# Patient Record
Sex: FEMALE | Race: WHITE | NOT HISPANIC OR LATINO | Employment: FULL TIME | URBAN - NONMETROPOLITAN AREA
[De-identification: names, ages, dates, MRNs, and addresses within clinical notes are randomized per-mention and may not be internally consistent; named-entity substitution may affect disease eponyms.]

---

## 2020-03-04 ENCOUNTER — TRANSCRIBE ORDERS (OUTPATIENT)
Dept: ADMINISTRATIVE | Facility: HOSPITAL | Age: 60
End: 2020-03-04

## 2020-03-04 DIAGNOSIS — G50.0 TRIGEMINAL NEURALGIA: Primary | ICD-10-CM

## 2020-03-11 ENCOUNTER — HOSPITAL ENCOUNTER (OUTPATIENT)
Dept: MRI IMAGING | Facility: HOSPITAL | Age: 60
Discharge: HOME | End: 2020-03-11

## 2020-03-11 DIAGNOSIS — G50.0 TRIGEMINAL NEURALGIA: ICD-10-CM

## 2020-03-11 LAB — CREAT BLDA-MCNC: 0.7 MG/DL (ref 0.6–1.3)

## 2020-03-11 PROCEDURE — 82565 ASSAY OF CREATININE: CPT

## 2024-10-21 ENCOUNTER — TRANSCRIBE ORDERS (OUTPATIENT)
Dept: ADMINISTRATIVE | Facility: HOSPITAL | Age: 64
End: 2024-10-21
Payer: COMMERCIAL

## 2024-10-21 DIAGNOSIS — R06.09 DYSPNEA ON EXERTION: ICD-10-CM

## 2024-10-21 DIAGNOSIS — R07.89 CHEST PRESSURE: Primary | ICD-10-CM

## 2024-10-25 ENCOUNTER — OFFICE VISIT (OUTPATIENT)
Dept: FAMILY MEDICINE CLINIC | Facility: CLINIC | Age: 64
End: 2024-10-25
Payer: COMMERCIAL

## 2024-10-25 VITALS
HEIGHT: 68 IN | BODY MASS INDEX: 36.07 KG/M2 | DIASTOLIC BLOOD PRESSURE: 88 MMHG | HEART RATE: 69 BPM | OXYGEN SATURATION: 98 % | RESPIRATION RATE: 18 BRPM | SYSTOLIC BLOOD PRESSURE: 140 MMHG | TEMPERATURE: 98 F | WEIGHT: 238 LBS

## 2024-10-25 DIAGNOSIS — M25.50 ARTHRALGIA, UNSPECIFIED JOINT: ICD-10-CM

## 2024-10-25 DIAGNOSIS — M25.40 JOINT SWELLING: ICD-10-CM

## 2024-10-25 DIAGNOSIS — I10 ESSENTIAL HYPERTENSION: Chronic | ICD-10-CM

## 2024-10-25 DIAGNOSIS — Z12.31 ENCOUNTER FOR SCREENING MAMMOGRAM FOR MALIGNANT NEOPLASM OF BREAST: ICD-10-CM

## 2024-10-25 DIAGNOSIS — G89.29 CHRONIC RIGHT SHOULDER PAIN: ICD-10-CM

## 2024-10-25 DIAGNOSIS — I48.0 PAROXYSMAL ATRIAL FIBRILLATION: Chronic | ICD-10-CM

## 2024-10-25 DIAGNOSIS — M79.641 RIGHT HAND PAIN: ICD-10-CM

## 2024-10-25 DIAGNOSIS — M25.531 RIGHT WRIST PAIN: ICD-10-CM

## 2024-10-25 DIAGNOSIS — Z76.89 ENCOUNTER TO ESTABLISH CARE: Primary | ICD-10-CM

## 2024-10-25 DIAGNOSIS — M25.511 CHRONIC RIGHT SHOULDER PAIN: ICD-10-CM

## 2024-10-25 DIAGNOSIS — F41.9 ANXIETY: ICD-10-CM

## 2024-10-25 PROBLEM — E78.2 MIXED HYPERLIPIDEMIA: Status: ACTIVE | Noted: 2024-10-25

## 2024-10-25 PROCEDURE — 99204 OFFICE O/P NEW MOD 45 MIN: CPT | Performed by: NURSE PRACTITIONER

## 2024-10-25 RX ORDER — INCLISIRAN 284 MG/1.5ML
INJECTION, SOLUTION SUBCUTANEOUS
COMMUNITY

## 2024-10-25 RX ORDER — DULOXETIN HYDROCHLORIDE 30 MG/1
30 CAPSULE, DELAYED RELEASE ORAL DAILY
Qty: 30 CAPSULE | Refills: 1 | Status: SHIPPED | OUTPATIENT
Start: 2024-10-25

## 2024-10-25 RX ORDER — FLUTICASONE PROPIONATE 50 UG/1
1 SPRAY, METERED NASAL 2 TIMES DAILY
COMMUNITY

## 2024-10-25 RX ORDER — LISINOPRIL 40 MG/1
40 TABLET ORAL DAILY
COMMUNITY

## 2024-10-25 RX ORDER — SENNOSIDES 8.6 MG
650 CAPSULE ORAL 2 TIMES DAILY
COMMUNITY

## 2024-10-25 RX ORDER — INDAPAMIDE 2.5 MG/1
1 TABLET ORAL DAILY
COMMUNITY

## 2024-10-25 RX ORDER — TIMOLOL MALEATE 5 MG/ML
1 SOLUTION/ DROPS OPHTHALMIC EVERY MORNING
COMMUNITY
Start: 2024-09-18

## 2024-10-25 RX ORDER — FLECAINIDE ACETATE 50 MG/1
50 TABLET ORAL 2 TIMES DAILY
COMMUNITY
Start: 2024-09-04

## 2024-10-25 RX ORDER — CITALOPRAM HYDROBROMIDE 10 MG/1
10 TABLET ORAL DAILY
Qty: 30 TABLET | Refills: 1 | Status: CANCELLED | OUTPATIENT
Start: 2024-10-25

## 2024-10-25 RX ORDER — LATANOPROST 0.005 %
1 DROPS OPHTHALMIC (EYE) NIGHTLY
COMMUNITY

## 2024-10-25 RX ORDER — DILTIAZEM HYDROCHLORIDE 120 MG/1
120 CAPSULE, EXTENDED RELEASE ORAL DAILY
COMMUNITY
Start: 2024-09-04

## 2024-10-25 RX ORDER — APIXABAN 5 MG/1
1 TABLET, FILM COATED ORAL 2 TIMES DAILY
COMMUNITY

## 2024-10-25 NOTE — PROGRESS NOTES
CC:   Chief Complaint   Patient presents with    [facility]    Pain     Shoulder and hand pain        History:  Violet Watts is a 63 y.o. female who presents today for evaluation of the above problems.      HPI    Patient presents to [facility]. I am familiar with patient.   Goes to cardiology routinely. States she was recently put on losartan and caused SE of memory problems and other odd symptoms- stopped that and doing better on lisinopril. She is still having intermittent chest pain so scheduled for additional work up- has Lexiscan coming up.   Reports she is feeling more anxious lately and irritable. Used to take Celexa and worked well for her in the past- interested in getting on medication again.     Riht shoulder pain- fell on right shoulder years ago- bothering her more lately. Wants xray to evaluate further. Unable to take NSAIDs due to medications and heart- Uses Tylenol and Voltaren with very little relief in symptoms.   Right hand- middle finger joints are swollen and tender. Another provider treated her for gout with no change in symptoms. Believe arthritic based on how her hands look and stiffness. Will order xray to evaluate further.   Due for mammogram- will get that ordered.     Allergies   Allergen Reactions    Losartan Mental Status Change    Praluent [Alirocumab] Rash    Repatha [Evolocumab] Rash     Past Medical History:   Diagnosis Date    Arthritis     Atrial fibrillation     Glaucoma (increased eye pressure) Left eye     Hyperlipidemia     Hypertension     SVT (supraventricular tachycardia)      Past Surgical History:   Procedure Laterality Date    BELPHAROPTOSIS REPAIR Bilateral     CATARACT EXTRACTION Bilateral     CHOLECYSTECTOMY      COLONOSCOPY  11/17/2023    Dr Pham.  On 10 year plan    ENDOLYMPHATIC SHUNT DECOMPRESSION      HYSTERECTOMY      LAMINECTOMY       Family History   Problem Relation Age of Onset    Breast cancer Mother      Cancer Mother     Cancer Sister     Breast cancer Sister     Breast cancer Sister     Cancer Sister       reports that she has never smoked. She has never been exposed to tobacco smoke. She has never used smokeless tobacco. She reports that she does not drink alcohol and does not use drugs.      Current Outpatient Medications:     acetaminophen (TYLENOL) 650 MG 8 hr tablet, Take 1 tablet by mouth 2 (Two) Times a Day., Disp: , Rfl:     Cartia  MG 24 hr capsule, Take 1 capsule by mouth Daily., Disp: , Rfl:     Cholecalciferol 1.25 MG (11078 UT) tablet, Take 1 tablet by mouth 1 (One) Time Per Week., Disp: 4 tablet, Rfl: 5    Eliquis 5 MG tablet tablet, Take 1 tablet by mouth 2 (Two) Times a Day., Disp: , Rfl:     flecainide (TAMBOCOR) 50 MG tablet, Take 1 tablet by mouth 2 (Two) Times a Day., Disp: , Rfl:     fluticasone (Flonase Allergy Relief) 50 MCG/ACT nasal spray, Administer 1 spray into the nostril(s) as directed by provider 2 (Two) Times a Day., Disp: , Rfl:     Inclisiran Sodium (Leqvio) 284 MG/1.5ML solution prefilled syringe, Inject 1.5 mL every 3 months by subcutaneous route., Disp: , Rfl:     indapamide (LOZOL) 2.5 MG tablet, Take 1 tablet by mouth Daily., Disp: , Rfl:     lisinopril (PRINIVIL,ZESTRIL) 40 MG tablet, Take 1 tablet by mouth Daily., Disp: , Rfl:     multivitamin with minerals (WOMENS MULTIVITAMIN PO), Take 1 tablet by mouth Daily., Disp: , Rfl:     Red Yeast Rice Extract 600 MG tablet, Take 1 tablet by mouth Daily., Disp: , Rfl:     timolol (TIMOPTIC) 0.5 % ophthalmic solution, Administer 1 drop to both eyes Every Morning., Disp: , Rfl:     Xalatan 0.005 % ophthalmic solution, Administer 1 drop to both eyes Every Night., Disp: , Rfl:     DULoxetine (CYMBALTA) 30 MG capsule, Take 1 capsule by mouth Daily., Disp: 30 capsule, Rfl: 1    OBJECTIVE:  /88 (BP Location: Left arm, Patient Position: Sitting, Cuff Size: Large Adult)   Pulse 69   Temp 98 °F (36.7 °C) (Temporal)   Resp 18    "Ht 172.7 cm (68\")   Wt 108 kg (238 lb)   SpO2 98%   BMI 36.19 kg/m²    Estimated body mass index is 36.19 kg/m² as calculated from the following:    Height as of this encounter: 172.7 cm (68\").    Weight as of this encounter: 108 kg (238 lb).                Physical Exam  Vitals reviewed.   Constitutional:       General: She is not in acute distress.     Appearance: Normal appearance.   HENT:      Head: Normocephalic and atraumatic.   Cardiovascular:      Rate and Rhythm: Normal rate and regular rhythm.      Heart sounds: Normal heart sounds.   Pulmonary:      Effort: No respiratory distress.      Breath sounds: Normal breath sounds. No wheezing.   Musculoskeletal:      Right shoulder: No tenderness. Normal range of motion.      Left shoulder: No tenderness. Normal range of motion.      Right hand: Bony tenderness (finger joints) present. Decreased range of motion (to fingers).      Left hand: Bony tenderness present. Decreased range of motion.      Cervical back: Normal range of motion.      Comments: DIP joints on right fingers inflamed (worse to middle finger)   Skin:     General: Skin is warm and dry.   Neurological:      Mental Status: She is alert and oriented to person, place, and time.   Psychiatric:         Mood and Affect: Mood normal.         Behavior: Behavior normal.              Assessment/Plan    Diagnoses and all orders for this visit:    1. Encounter to establish care (Primary)    2. Chronic right shoulder pain  -     XR Shoulder 2+ View Right; Future    3. Right hand pain  -     XR Hand 3+ View Right; Future    4. Joint swelling  -     XR Hand 3+ View Right; Future    5. Right wrist pain  -     XR Wrist 3+ View Right; Future    6. Anxiety  -     DULoxetine (CYMBALTA) 30 MG capsule; Take 1 capsule by mouth Daily.  Dispense: 30 capsule; Refill: 1    7. Arthralgia, unspecified joint  -     DULoxetine (CYMBALTA) 30 MG capsule; Take 1 capsule by mouth Daily.  Dispense: 30 capsule; Refill: 1    8. " Encounter for screening mammogram for malignant neoplasm of breast  -     Mammo Screening Digital Tomosynthesis Bilateral With CAD; Future    9. Essential hypertension    10. Paroxysmal atrial fibrillation    Other orders  -     Cholecalciferol 1.25 MG (52279 UT) tablet; Take 1 tablet by mouth 1 (One) Time Per Week.  Dispense: 4 tablet; Refill: 5    Xray to evaluate further. Likely arthritis related.   Will try Cymbalta to see if that will help with both joint inflammation and anxiety. Medication SE discussed. If any concerns- let us know.   F/u 1 month            An After Visit Summary was printed and given to the patient at discharge.  Return in about 4 weeks (around 11/22/2024) for Recheck anxiety and joint pain.

## 2024-11-20 ENCOUNTER — OFFICE VISIT (OUTPATIENT)
Dept: FAMILY MEDICINE CLINIC | Facility: CLINIC | Age: 64
End: 2024-11-20
Payer: COMMERCIAL

## 2024-11-20 VITALS
SYSTOLIC BLOOD PRESSURE: 144 MMHG | WEIGHT: 235.4 LBS | OXYGEN SATURATION: 98 % | BODY MASS INDEX: 35.68 KG/M2 | DIASTOLIC BLOOD PRESSURE: 87 MMHG | TEMPERATURE: 98.2 F | HEART RATE: 71 BPM | HEIGHT: 68 IN

## 2024-11-20 DIAGNOSIS — R05.1 ACUTE COUGH: ICD-10-CM

## 2024-11-20 DIAGNOSIS — J06.9 UPPER RESPIRATORY TRACT INFECTION, UNSPECIFIED TYPE: Primary | ICD-10-CM

## 2024-11-20 LAB
EXPIRATION DATE: NORMAL
FLUAV AG UPPER RESP QL IA.RAPID: NOT DETECTED
FLUBV AG UPPER RESP QL IA.RAPID: NOT DETECTED
INTERNAL CONTROL: NORMAL
Lab: NORMAL
SARS-COV-2 AG UPPER RESP QL IA.RAPID: NOT DETECTED

## 2024-11-20 RX ORDER — TRIAMCINOLONE ACETONIDE 40 MG/ML
40 INJECTION, SUSPENSION INTRA-ARTICULAR; INTRAMUSCULAR ONCE
Status: COMPLETED | OUTPATIENT
Start: 2024-11-20 | End: 2024-11-20

## 2024-11-20 RX ORDER — AZITHROMYCIN 250 MG/1
TABLET, FILM COATED ORAL
Qty: 6 TABLET | Refills: 0 | Status: SHIPPED | OUTPATIENT
Start: 2024-11-20

## 2024-11-20 RX ADMIN — TRIAMCINOLONE ACETONIDE 40 MG: 40 INJECTION, SUSPENSION INTRA-ARTICULAR; INTRAMUSCULAR at 10:40

## 2024-11-20 NOTE — PROGRESS NOTES
CC:   Chief Complaint   Patient presents with    Cough     Symptoms started on Friday.  Exposed to flu at work    Nasal Congestion    Headache    Dizziness    Chills    Sore Throat    Generalized Body Aches        History:  Violet Watts is a 64 y.o. female who presents today for evaluation of the above problems.      HPI    Patient presents for illness.  Symptoms started on Friday with head and chest congestion.  States it has progressively gotten a little worse.  Taking over-the-counter Zyrtec-D with some relief of symptoms.  Denies any fever.  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  Patient denies any GI symptoms.      Did not tolerate Cymbalta- states she only took 2 pills- it caused her to be confused and unable to sleep. Overall just didn't feel right on the medication.     Allergies   Allergen Reactions    Losartan Mental Status Change    Praluent [Alirocumab] Rash    Repatha [Evolocumab] Rash     Past Medical History:   Diagnosis Date    Arthritis     Atrial fibrillation     Glaucoma (increased eye pressure) Left eye     Hyperlipidemia     Hypertension     SVT (supraventricular tachycardia)      Past Surgical History:   Procedure Laterality Date    BELPHAROPTOSIS REPAIR Bilateral     CATARACT EXTRACTION Bilateral     CHOLECYSTECTOMY      COLONOSCOPY  11/17/2023    Dr Pham.  On 10 year plan    ENDOLYMPHATIC SHUNT DECOMPRESSION      HYSTERECTOMY      LAMINECTOMY       Family History   Problem Relation Age of Onset    Breast cancer Mother     Cancer Mother     Cancer Sister     Breast cancer Sister     Breast cancer Sister     Cancer Sister       reports that she has never smoked. She has never been exposed to tobacco smoke. She has never used smokeless tobacco. She reports that she does not drink alcohol and does not use drugs.      Current Outpatient Medications:     acetaminophen (TYLENOL) 650 MG 8 hr tablet, Take 1 tablet by mouth 2 (Two) Times a Day., Disp: , Rfl:     Cartia  " MG 24 hr capsule, Take 1 capsule by mouth Daily., Disp: , Rfl:     Cetirizine-Pseudoephedrine (ZYRTEC-D ALLERGY & CONGESTION PO), Take  by mouth., Disp: , Rfl:     Cholecalciferol 1.25 MG (77218 UT) tablet, Take 1 tablet by mouth 1 (One) Time Per Week., Disp: 4 tablet, Rfl: 5    Eliquis 5 MG tablet tablet, Take 1 tablet by mouth 2 (Two) Times a Day., Disp: , Rfl:     flecainide (TAMBOCOR) 50 MG tablet, Take 1 tablet by mouth 2 (Two) Times a Day., Disp: , Rfl:     fluticasone (Flonase Allergy Relief) 50 MCG/ACT nasal spray, Administer 1 spray into the nostril(s) as directed by provider 2 (Two) Times a Day., Disp: , Rfl:     Inclisiran Sodium (Leqvio) 284 MG/1.5ML solution prefilled syringe, Inject 1.5 mL every 3 months by subcutaneous route., Disp: , Rfl:     indapamide (LOZOL) 2.5 MG tablet, Take 1 tablet by mouth Daily., Disp: , Rfl:     lisinopril (PRINIVIL,ZESTRIL) 40 MG tablet, Take 1 tablet by mouth Daily., Disp: , Rfl:     multivitamin with minerals (WOMENS MULTIVITAMIN PO), Take 1 tablet by mouth Daily., Disp: , Rfl:     Red Yeast Rice Extract 600 MG tablet, Take 1 tablet by mouth Daily., Disp: , Rfl:     timolol (TIMOPTIC) 0.5 % ophthalmic solution, Administer 1 drop to both eyes Every Morning., Disp: , Rfl:     Xalatan 0.005 % ophthalmic solution, Administer 1 drop to both eyes Every Night., Disp: , Rfl:     azithromycin (Zithromax Z-Darian) 250 MG tablet, Take 2 tablets by mouth on day 1, then 1 tablet daily on days 2-5, Disp: 6 tablet, Rfl: 0  No current facility-administered medications for this visit.    OBJECTIVE:  /87 (BP Location: Right arm, Patient Position: Sitting, Cuff Size: Large Adult)   Pulse 71   Temp 98.2 °F (36.8 °C) (Temporal)   Ht 172.7 cm (68\")   Wt 107 kg (235 lb 6.4 oz)   SpO2 98%   BMI 35.79 kg/m²    Estimated body mass index is 35.79 kg/m² as calculated from the following:    Height as of this encounter: 172.7 cm (68\").    Weight as of this encounter: 107 kg (235 lb " 6.4 oz).                Physical Exam  Vitals reviewed.   Constitutional:       General: She is not in acute distress.     Appearance: Normal appearance.   HENT:      Head: Normocephalic and atraumatic.      Right Ear: Tympanic membrane and external ear normal.      Left Ear: Tympanic membrane and external ear normal.      Nose: No congestion.      Right Sinus: Maxillary sinus tenderness and frontal sinus tenderness present.      Left Sinus: Maxillary sinus tenderness and frontal sinus tenderness present.      Mouth/Throat:      Pharynx: Oropharynx is clear.   Cardiovascular:      Rate and Rhythm: Normal rate and regular rhythm.      Heart sounds: Normal heart sounds.   Pulmonary:      Effort: No respiratory distress.      Breath sounds: Normal breath sounds. No wheezing.   Musculoskeletal:         General: Normal range of motion.   Lymphadenopathy:      Cervical: No cervical adenopathy.   Skin:     General: Skin is warm and dry.   Neurological:      Mental Status: She is alert and oriented to person, place, and time.   Psychiatric:         Mood and Affect: Mood normal.         Behavior: Behavior normal.              Assessment/Plan    Diagnoses and all orders for this visit:    1. Upper respiratory tract infection, unspecified type (Primary)  -     azithromycin (Zithromax Z-Darian) 250 MG tablet; Take 2 tablets by mouth on day 1, then 1 tablet daily on days 2-5  Dispense: 6 tablet; Refill: 0  -     triamcinolone acetonide (KENALOG-40) injection 40 mg    2. Acute cough  -     POCT SARS-CoV-2 Antigen ELEAZAR + Flu    Medication SE discussed. If any concerns- patient to let us know. Discussed OTC medications to help with symptoms.   If worse or no better in 3-5 days- patient to let us know            An After Visit Summary was printed and given to the patient at discharge.  Return if symptoms worsen or fail to improve.

## 2024-11-25 ENCOUNTER — HOSPITAL ENCOUNTER (OUTPATIENT)
Dept: CARDIOLOGY | Facility: HOSPITAL | Age: 64
Discharge: HOME | End: 2024-11-25
Payer: COMMERCIAL

## 2024-11-25 VITALS
BODY MASS INDEX: 33.77 KG/M2 | WEIGHT: 235.89 LBS | HEART RATE: 75 BPM | SYSTOLIC BLOOD PRESSURE: 144 MMHG | HEIGHT: 70 IN | DIASTOLIC BLOOD PRESSURE: 108 MMHG

## 2024-11-25 DIAGNOSIS — R07.89 CHEST PRESSURE: ICD-10-CM

## 2024-11-25 DIAGNOSIS — R06.09 DYSPNEA ON EXERTION: ICD-10-CM

## 2024-11-25 LAB
BH CV NUCLEAR PRIOR STUDY: 3
BH CV REST NUCLEAR ISOTOPE DOSE: 11.2 MCI
BH CV STRESS BP STAGE 1: NORMAL
BH CV STRESS COMMENTS STAGE 1: NORMAL
BH CV STRESS DOSE REGADENOSON STAGE 1: 0.4
BH CV STRESS DURATION MIN STAGE 1: 0
BH CV STRESS DURATION SEC STAGE 1: 10
BH CV STRESS HR STAGE 1: 94
BH CV STRESS NUCLEAR ISOTOPE DOSE: 34.9 MCI
BH CV STRESS PROTOCOL 1: NORMAL
BH CV STRESS RECOVERY BP: NORMAL MMHG
BH CV STRESS RECOVERY HR: 77 BPM
BH CV STRESS STAGE 1: 1
LV EF NUC BP: 65 %
MAXIMAL PREDICTED HEART RATE: 155 BPM
PERCENT MAX PREDICTED HR: 60.65 %
STRESS BASELINE BP: NORMAL MMHG
STRESS BASELINE HR: 70 BPM
STRESS PERCENT HR: 71 %
STRESS POST EXERCISE DUR MIN: 0 MIN
STRESS POST EXERCISE DUR SEC: 10 SEC
STRESS POST PEAK BP: NORMAL MMHG
STRESS POST PEAK HR: 94 BPM
STRESS TARGET HR: 132 BPM

## 2024-11-25 PROCEDURE — 93018 CV STRESS TEST I&R ONLY: CPT | Performed by: INTERNAL MEDICINE

## 2024-11-25 PROCEDURE — 25010000002 REGADENOSON 0.4 MG/5ML SOLUTION: Performed by: INTERNAL MEDICINE

## 2024-11-25 PROCEDURE — 78452 HT MUSCLE IMAGE SPECT MULT: CPT

## 2024-11-25 PROCEDURE — 34310000005 TECHNETIUM TETROFOSMIN KIT: Performed by: NURSE PRACTITIONER

## 2024-11-25 PROCEDURE — 78452 HT MUSCLE IMAGE SPECT MULT: CPT | Performed by: INTERNAL MEDICINE

## 2024-11-25 PROCEDURE — A9502 TC99M TETROFOSMIN: HCPCS | Performed by: NURSE PRACTITIONER

## 2024-11-25 PROCEDURE — 93017 CV STRESS TEST TRACING ONLY: CPT

## 2024-11-25 RX ORDER — REGADENOSON 0.08 MG/ML
0.4 INJECTION, SOLUTION INTRAVENOUS ONCE
Status: COMPLETED | OUTPATIENT
Start: 2024-11-25 | End: 2024-11-25

## 2024-11-25 RX ADMIN — TETROFOSMIN 1 DOSE: 1.38 INJECTION, POWDER, LYOPHILIZED, FOR SOLUTION INTRAVENOUS at 10:53

## 2024-11-25 RX ADMIN — REGADENOSON 0.4 MG: 0.08 INJECTION, SOLUTION INTRAVENOUS at 11:44

## 2024-11-25 RX ADMIN — TETROFOSMIN 1 DOSE: 1.38 INJECTION, POWDER, LYOPHILIZED, FOR SOLUTION INTRAVENOUS at 11:50

## 2025-01-15 ENCOUNTER — OFFICE VISIT (OUTPATIENT)
Dept: FAMILY MEDICINE CLINIC | Facility: CLINIC | Age: 65
End: 2025-01-15
Payer: COMMERCIAL

## 2025-01-15 VITALS
HEART RATE: 76 BPM | BODY MASS INDEX: 33.36 KG/M2 | WEIGHT: 233 LBS | OXYGEN SATURATION: 98 % | HEIGHT: 70 IN | SYSTOLIC BLOOD PRESSURE: 159 MMHG | RESPIRATION RATE: 18 BRPM | DIASTOLIC BLOOD PRESSURE: 95 MMHG | TEMPERATURE: 98.1 F

## 2025-01-15 DIAGNOSIS — Z11.59 ENCOUNTER FOR HEPATITIS C SCREENING TEST FOR LOW RISK PATIENT: ICD-10-CM

## 2025-01-15 DIAGNOSIS — E78.5 HYPERLIPIDEMIA, UNSPECIFIED HYPERLIPIDEMIA TYPE: ICD-10-CM

## 2025-01-15 DIAGNOSIS — R10.2 PELVIC PRESSURE IN FEMALE: ICD-10-CM

## 2025-01-15 DIAGNOSIS — R42 DIZZINESS: ICD-10-CM

## 2025-01-15 DIAGNOSIS — R55 SYNCOPE, UNSPECIFIED SYNCOPE TYPE: Primary | ICD-10-CM

## 2025-01-15 LAB
BILIRUB BLD-MCNC: NEGATIVE MG/DL
CLARITY, POC: CLEAR
COLOR UR: YELLOW
GLUCOSE UR STRIP-MCNC: NEGATIVE MG/DL
KETONES UR QL: NEGATIVE
LEUKOCYTE EST, POC: ABNORMAL
NITRITE UR-MCNC: NEGATIVE MG/ML
PH UR: 5.5 [PH] (ref 5–8)
PROT UR STRIP-MCNC: NEGATIVE MG/DL
RBC # UR STRIP: NEGATIVE /UL
SP GR UR: 1.01 (ref 1–1.03)
UROBILINOGEN UR QL: ABNORMAL

## 2025-01-15 PROCEDURE — 81003 URINALYSIS AUTO W/O SCOPE: CPT | Performed by: NURSE PRACTITIONER

## 2025-01-15 PROCEDURE — 99214 OFFICE O/P EST MOD 30 MIN: CPT | Performed by: NURSE PRACTITIONER

## 2025-01-15 NOTE — PROGRESS NOTES
CC:   Chief Complaint   Patient presents with    Syncope     Last Friday after having severe back pain        History:  Violet Watts is a 64 y.o. female who presents today for evaluation of the above problems.      HPI    Patient reports she had a syncopal episode on Friday afternoon. She had taken a nap before work and when she woke up and was sitting on side of bed for a minute- felt severe low back pain, lightheaded and felt like she was going to get sick. She passed out - falling back on bed.  said she was passed out for about 10 minutes, urinated on herself. Was dizzy, disoriented and vomited twice once she woke up. States she could hear her  talking to her but was blacked out. Patient states she felt a little better after she vomited. She has weak and felt off several hours following that. She did not go to ER. Denies biting her tongue- states there was no jerking- she does not feel like she had a seizure. No history of seizures. Denies any chest pain. Reports on Saturday she felt better and has not had any symptoms of anything. Has appt with cardiology next week.   Patient mentioned she hasn't been eating much lately- decrease in appetite- only eating a bowl of cereal and some fruit most days.     Allergies   Allergen Reactions    Losartan Mental Status Change    Adalat [Nifedipine] Swelling    Phenazopyridine Itching    Statins Myalgia    Praluent [Alirocumab] Rash    Repatha [Evolocumab] Rash    Sulfa Antibiotics Rash     Past Medical History:   Diagnosis Date    Arthritis     Atrial fibrillation     Glaucoma (increased eye pressure) Left eye     Hyperlipidemia     Hypertension     SVT (supraventricular tachycardia)      Past Surgical History:   Procedure Laterality Date    BELPHAROPTOSIS REPAIR Bilateral     CATARACT EXTRACTION Bilateral     CHOLECYSTECTOMY      COLONOSCOPY  11/17/2023    Dr Pham.  On 10 year plan    ENDOLYMPHATIC SHUNT DECOMPRESSION      HYSTERECTOMY      LAMINECTOMY        Family History   Problem Relation Age of Onset    Breast cancer Mother     Cancer Mother     Cancer Sister     Breast cancer Sister     Breast cancer Sister     Cancer Sister       reports that she has never smoked. She has never been exposed to tobacco smoke. She has never used smokeless tobacco. She reports that she does not drink alcohol and does not use drugs.      Current Outpatient Medications:     acetaminophen (TYLENOL) 650 MG 8 hr tablet, Take 1 tablet by mouth 2 (Two) Times a Day., Disp: , Rfl:     Cartia  MG 24 hr capsule, Take 1 capsule by mouth Daily., Disp: , Rfl:     Cetirizine-Pseudoephedrine (ZYRTEC-D ALLERGY & CONGESTION PO), Take  by mouth., Disp: , Rfl:     Cholecalciferol 1.25 MG (61619 UT) tablet, Take 1 tablet by mouth 1 (One) Time Per Week., Disp: 4 tablet, Rfl: 5    Eliquis 5 MG tablet tablet, Take 1 tablet by mouth 2 (Two) Times a Day., Disp: , Rfl:     flecainide (TAMBOCOR) 50 MG tablet, Take 1 tablet by mouth 2 (Two) Times a Day., Disp: , Rfl:     fluticasone (Flonase Allergy Relief) 50 MCG/ACT nasal spray, Administer 1 spray into the nostril(s) as directed by provider 2 (Two) Times a Day., Disp: , Rfl:     Inclisiran Sodium (Leqvio) 284 MG/1.5ML solution prefilled syringe, Every 6 (Six) Months., Disp: , Rfl:     indapamide (LOZOL) 2.5 MG tablet, Take 1 tablet by mouth Daily., Disp: , Rfl:     lisinopril (PRINIVIL,ZESTRIL) 40 MG tablet, Take 1 tablet by mouth Daily., Disp: , Rfl:     multivitamin with minerals (WOMENS MULTIVITAMIN PO), Take 1 tablet by mouth Daily., Disp: , Rfl:     Red Yeast Rice Extract 600 MG tablet, Take 1 tablet by mouth Daily., Disp: , Rfl:     timolol (TIMOPTIC) 0.5 % ophthalmic solution, Administer 1 drop to both eyes Every Morning., Disp: , Rfl:     Xalatan 0.005 % ophthalmic solution, Administer 1 drop to both eyes Every Night., Disp: , Rfl:     OBJECTIVE:  /95 (BP Location: Right arm, Patient Position: Sitting, Cuff Size: Large Adult)   Pulse  "76   Temp 98.1 °F (36.7 °C) (Temporal)   Resp 18   Ht 177.8 cm (70\")   Wt 106 kg (233 lb)   SpO2 98%   BMI 33.43 kg/m²    Estimated body mass index is 33.43 kg/m² as calculated from the following:    Height as of this encounter: 177.8 cm (70\").    Weight as of this encounter: 106 kg (233 lb).                Physical Exam  Vitals reviewed.   Constitutional:       General: She is not in acute distress.     Appearance: Normal appearance.   HENT:      Head: Normocephalic and atraumatic.      Right Ear: Tympanic membrane and external ear normal.      Left Ear: Tympanic membrane and external ear normal.   Neck:      Vascular: No carotid bruit.   Cardiovascular:      Rate and Rhythm: Normal rate and regular rhythm.      Heart sounds: Normal heart sounds.   Pulmonary:      Effort: No respiratory distress.      Breath sounds: Normal breath sounds. No wheezing.   Abdominal:      General: Bowel sounds are normal.      Palpations: Abdomen is soft.      Tenderness: There is no abdominal tenderness.   Musculoskeletal:         General: Normal range of motion.      Cervical back: Normal range of motion.   Lymphadenopathy:      Cervical: No cervical adenopathy.   Skin:     General: Skin is warm and dry.   Neurological:      Mental Status: She is alert and oriented to person, place, and time.      Gait: Gait normal.   Psychiatric:         Mood and Affect: Mood normal.         Behavior: Behavior normal.              Assessment/Plan    Diagnoses and all orders for this visit:    1. Syncope, unspecified syncope type (Primary)  -     CBC & Differential  -     Comprehensive Metabolic Panel  -     Vitamin B12  -     TSH    Discussed differentials- believe it was a vasovagal response due to pain since she had severe back pain prior to passing out. Likely was back spasm since back pain resolved. Denies any stroke like symptoms prior to event. Patient does have paroxysmal afib- states her HR on her watch was in the upper 50's when she " checked it upon waking back up. Now- HR is regular rhythm on exam- has f/u with cardiology next week. Will do blood work to evaluate further. Patient mentioned some pelvic pressure so wanted checked for UTI- UA was good. Highly recommended her to eat more frequent meals with protein- not getting enough nutrition with current diet. With her incontinence and symptoms following the episode- will do MRI of head to evaluate further as well. Patient states she may need medication to calm her nerves due to anxiety having that done.     2. Dizziness  -     CBC & Differential  -     Comprehensive Metabolic Panel  -     Vitamin B12  -     TSH    3. Encounter for hepatitis C screening test for low risk patient  -     Hepatitis C Antibody    4. Hyperlipidemia, unspecified hyperlipidemia type  -     Lipid Panel    5. Pelvic pressure in female  -     POC Urinalysis Dipstick, Multipro                An After Visit Summary was printed and given to the patient at discharge.  Return in about 2 weeks (around 1/29/2025) for Recheck.

## 2025-01-16 LAB
ALBUMIN SERPL-MCNC: 4.6 G/DL (ref 3.5–5.2)
ALBUMIN/GLOB SERPL: 1.7 G/DL
ALP SERPL-CCNC: 71 U/L (ref 39–117)
ALT SERPL-CCNC: 26 U/L (ref 1–33)
AST SERPL-CCNC: 23 U/L (ref 1–32)
BASOPHILS # BLD AUTO: 0.06 10*3/MM3 (ref 0–0.2)
BASOPHILS NFR BLD AUTO: 0.5 % (ref 0–1.5)
BILIRUB SERPL-MCNC: 1 MG/DL (ref 0–1.2)
BUN SERPL-MCNC: 12 MG/DL (ref 8–23)
BUN/CREAT SERPL: 15.4 (ref 7–25)
CALCIUM SERPL-MCNC: 10.2 MG/DL (ref 8.6–10.5)
CHLORIDE SERPL-SCNC: 98 MMOL/L (ref 98–107)
CHOLEST SERPL-MCNC: 166 MG/DL (ref 0–200)
CO2 SERPL-SCNC: 26.5 MMOL/L (ref 22–29)
CREAT SERPL-MCNC: 0.78 MG/DL (ref 0.57–1)
EGFRCR SERPLBLD CKD-EPI 2021: 84.4 ML/MIN/1.73
EOSINOPHIL # BLD AUTO: 0.18 10*3/MM3 (ref 0–0.4)
EOSINOPHIL NFR BLD AUTO: 1.6 % (ref 0.3–6.2)
ERYTHROCYTE [DISTWIDTH] IN BLOOD BY AUTOMATED COUNT: 12.5 % (ref 12.3–15.4)
GLOBULIN SER CALC-MCNC: 2.7 GM/DL
GLUCOSE SERPL-MCNC: 96 MG/DL (ref 65–99)
HCT VFR BLD AUTO: 44.9 % (ref 34–46.6)
HCV IGG SERPL QL IA: NON REACTIVE
HDLC SERPL-MCNC: 58 MG/DL (ref 40–60)
HGB BLD-MCNC: 15.3 G/DL (ref 12–15.9)
IMM GRANULOCYTES # BLD AUTO: 0.05 10*3/MM3 (ref 0–0.05)
IMM GRANULOCYTES NFR BLD AUTO: 0.4 % (ref 0–0.5)
LDLC SERPL CALC-MCNC: 59 MG/DL (ref 0–100)
LYMPHOCYTES # BLD AUTO: 3.97 10*3/MM3 (ref 0.7–3.1)
LYMPHOCYTES NFR BLD AUTO: 34.6 % (ref 19.6–45.3)
MCH RBC QN AUTO: 30.8 PG (ref 26.6–33)
MCHC RBC AUTO-ENTMCNC: 34.1 G/DL (ref 31.5–35.7)
MCV RBC AUTO: 90.3 FL (ref 79–97)
MONOCYTES # BLD AUTO: 0.67 10*3/MM3 (ref 0.1–0.9)
MONOCYTES NFR BLD AUTO: 5.8 % (ref 5–12)
NEUTROPHILS # BLD AUTO: 6.54 10*3/MM3 (ref 1.7–7)
NEUTROPHILS NFR BLD AUTO: 57.1 % (ref 42.7–76)
NRBC BLD AUTO-RTO: 0 /100 WBC (ref 0–0.2)
PLATELET # BLD AUTO: 372 10*3/MM3 (ref 140–450)
POTASSIUM SERPL-SCNC: 4.2 MMOL/L (ref 3.5–5.2)
PROT SERPL-MCNC: 7.3 G/DL (ref 6–8.5)
RBC # BLD AUTO: 4.97 10*6/MM3 (ref 3.77–5.28)
SODIUM SERPL-SCNC: 138 MMOL/L (ref 136–145)
TRIGL SERPL-MCNC: 317 MG/DL (ref 0–150)
TSH SERPL DL<=0.005 MIU/L-ACNC: 2.07 UIU/ML (ref 0.27–4.2)
VIT B12 SERPL-MCNC: 209 PG/ML (ref 211–946)
VLDLC SERPL CALC-MCNC: 49 MG/DL (ref 5–40)
WBC # BLD AUTO: 11.47 10*3/MM3 (ref 3.4–10.8)

## 2025-01-16 RX ORDER — LANOLIN ALCOHOL/MO/W.PET/CERES
1000 CREAM (GRAM) TOPICAL DAILY
Qty: 30 TABLET | Refills: 3 | Status: SHIPPED | OUTPATIENT
Start: 2025-01-16

## 2025-01-27 ENCOUNTER — OFFICE VISIT (OUTPATIENT)
Dept: FAMILY MEDICINE CLINIC | Facility: CLINIC | Age: 65
End: 2025-01-27
Payer: COMMERCIAL

## 2025-01-27 VITALS
SYSTOLIC BLOOD PRESSURE: 155 MMHG | BODY MASS INDEX: 33.07 KG/M2 | WEIGHT: 231 LBS | DIASTOLIC BLOOD PRESSURE: 98 MMHG | TEMPERATURE: 97.2 F | OXYGEN SATURATION: 98 % | HEIGHT: 70 IN | RESPIRATION RATE: 18 BRPM | HEART RATE: 68 BPM

## 2025-01-27 DIAGNOSIS — M54.50 CHRONIC MIDLINE LOW BACK PAIN WITHOUT SCIATICA: ICD-10-CM

## 2025-01-27 DIAGNOSIS — M54.6 CHRONIC BILATERAL THORACIC BACK PAIN: ICD-10-CM

## 2025-01-27 DIAGNOSIS — G89.29 CHRONIC BILATERAL THORACIC BACK PAIN: ICD-10-CM

## 2025-01-27 DIAGNOSIS — M54.2 CERVICALGIA: Primary | ICD-10-CM

## 2025-01-27 DIAGNOSIS — G89.29 CHRONIC MIDLINE LOW BACK PAIN WITHOUT SCIATICA: ICD-10-CM

## 2025-01-27 PROCEDURE — 99214 OFFICE O/P EST MOD 30 MIN: CPT | Performed by: NURSE PRACTITIONER

## 2025-01-27 NOTE — PROGRESS NOTES
CC:   Chief Complaint   Patient presents with    Follow-up     Back pain worse than before.        History:  Violet Watts is a 64 y.o. female who presents today for evaluation of the above problems.      HPI    Patient presents for follow-up.  Reports she had another syncopal episode last Monday- felt sick to stomach while using the bathroom and passed out briefly.  States she fell off toilet hitting her left shoulder on tub.  Reports sometimes when she has upset stomach she gets sick and has episodes of syncope.  I had ordered MRI for syncope episode-patient has not had that done yet.  States she is unsure if she will get it done since no neurological symptoms.    Patient has followed up with cardiology recently.  She described her episodes with them as well.    Patient states her back pain is persistent and seems to be worsening.  Reports pain from the neck down.  States back pain has been chronic but recently having more left leg numbness and tingling.  Patient would like further evaluation of back pain.      Allergies   Allergen Reactions    Losartan Mental Status Change    Adalat [Nifedipine] Swelling    Phenazopyridine Itching    Statins Myalgia    Praluent [Alirocumab] Rash    Repatha [Evolocumab] Rash    Sulfa Antibiotics Rash     Past Medical History:   Diagnosis Date    Arthritis     Atrial fibrillation     Glaucoma (increased eye pressure) Left eye     Hyperlipidemia     Hypertension     SVT (supraventricular tachycardia)      Past Surgical History:   Procedure Laterality Date    BELPHAROPTOSIS REPAIR Bilateral     CATARACT EXTRACTION Bilateral     CHOLECYSTECTOMY      COLONOSCOPY  11/17/2023    Dr Pham.  On 10 year plan    ENDOLYMPHATIC SHUNT DECOMPRESSION      HYSTERECTOMY      LAMINECTOMY       Family History   Problem Relation Age of Onset    Breast cancer Mother     Cancer Mother     Cancer Sister     Breast cancer Sister     Breast cancer Sister     Cancer Sister       reports that she has never  "smoked. She has never been exposed to tobacco smoke. She has never used smokeless tobacco. She reports that she does not drink alcohol and does not use drugs.      Current Outpatient Medications:     acetaminophen (TYLENOL) 650 MG 8 hr tablet, Take 1 tablet by mouth 2 (Two) Times a Day., Disp: , Rfl:     Cartia  MG 24 hr capsule, Take 1 capsule by mouth Daily., Disp: , Rfl:     Cetirizine-Pseudoephedrine (ZYRTEC-D ALLERGY & CONGESTION PO), Take  by mouth., Disp: , Rfl:     Cholecalciferol 1.25 MG (48722 UT) tablet, Take 1 tablet by mouth 1 (One) Time Per Week., Disp: 4 tablet, Rfl: 5    Eliquis 5 MG tablet tablet, Take 1 tablet by mouth 2 (Two) Times a Day., Disp: , Rfl:     flecainide (TAMBOCOR) 50 MG tablet, Take 1 tablet by mouth 2 (Two) Times a Day., Disp: , Rfl:     fluticasone (Flonase Allergy Relief) 50 MCG/ACT nasal spray, Administer 1 spray into the nostril(s) as directed by provider 2 (Two) Times a Day., Disp: , Rfl:     Inclisiran Sodium (Leqvio) 284 MG/1.5ML solution prefilled syringe, Every 6 (Six) Months., Disp: , Rfl:     indapamide (LOZOL) 2.5 MG tablet, Take 1 tablet by mouth Daily., Disp: , Rfl:     lisinopril (PRINIVIL,ZESTRIL) 40 MG tablet, Take 1 tablet by mouth Daily., Disp: , Rfl:     multivitamin with minerals (WOMENS MULTIVITAMIN PO), Take 1 tablet by mouth Daily., Disp: , Rfl:     Red Yeast Rice Extract 600 MG tablet, Take 1 tablet by mouth Daily., Disp: , Rfl:     timolol (TIMOPTIC) 0.5 % ophthalmic solution, Administer 1 drop to both eyes Every Morning., Disp: , Rfl:     vitamin B-12 (CYANOCOBALAMIN) 1000 MCG tablet, Take 1 tablet by mouth Daily., Disp: 30 tablet, Rfl: 3    Xalatan 0.005 % ophthalmic solution, Administer 1 drop to both eyes Every Night., Disp: , Rfl:     OBJECTIVE:  /98 (BP Location: Left arm, Patient Position: Sitting, Cuff Size: Large Adult)   Pulse 68   Temp 97.2 °F (36.2 °C) (Temporal)   Resp 18   Ht 177.8 cm (70\")   Wt 105 kg (231 lb)   SpO2 98%   " "BMI 33.15 kg/m²    Estimated body mass index is 33.15 kg/m² as calculated from the following:    Height as of this encounter: 177.8 cm (70\").    Weight as of this encounter: 105 kg (231 lb).                Physical Exam  Vitals reviewed.   Constitutional:       General: She is not in acute distress.     Appearance: Normal appearance.   HENT:      Head: Normocephalic and atraumatic.   Cardiovascular:      Rate and Rhythm: Normal rate and regular rhythm.      Heart sounds: Normal heart sounds.   Pulmonary:      Effort: No respiratory distress.      Breath sounds: Normal breath sounds. No wheezing.   Musculoskeletal:         General: Normal range of motion.      Cervical back: Tenderness present.      Thoracic back: Tenderness present.      Lumbar back: Tenderness present.   Skin:     General: Skin is warm and dry.   Neurological:      Mental Status: She is alert and oriented to person, place, and time.   Psychiatric:         Mood and Affect: Mood normal.         Behavior: Behavior normal.              Assessment/Plan    Diagnoses and all orders for this visit:    1. Cervicalgia (Primary)  -     XR Spine Cervical Complete 4 or 5 View; Future    2. Chronic bilateral thoracic back pain  -     XR Spine Thoracic 3 View; Future    3. Chronic midline low back pain without sciatica  -     XR Spine Lumbar Complete 4+VW; Future    X-rays to evaluate further.  Discussed she may need physical therapy and MRI in future if pain continues.            An After Visit Summary was printed and given to the patient at discharge.  Return in about 4 weeks (around 2/24/2025) for Recheck.           "

## 2025-06-02 ENCOUNTER — OFFICE VISIT (OUTPATIENT)
Dept: FAMILY MEDICINE CLINIC | Facility: CLINIC | Age: 65
End: 2025-06-02
Payer: COMMERCIAL

## 2025-06-02 VITALS
BODY MASS INDEX: 32.07 KG/M2 | DIASTOLIC BLOOD PRESSURE: 70 MMHG | HEIGHT: 70 IN | TEMPERATURE: 98.1 F | WEIGHT: 224 LBS | HEART RATE: 80 BPM | RESPIRATION RATE: 18 BRPM | OXYGEN SATURATION: 96 % | SYSTOLIC BLOOD PRESSURE: 136 MMHG

## 2025-06-02 DIAGNOSIS — M62.838 MUSCLE SPASM: ICD-10-CM

## 2025-06-02 DIAGNOSIS — M79.671 RIGHT FOOT PAIN: Primary | ICD-10-CM
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
## 2025-06-02 PROCEDURE — 99213 OFFICE O/P EST LOW 20 MIN: CPT | Performed by: NURSE PRACTITIONER

## 2025-06-02 RX ORDER — METHOCARBAMOL 500 MG/1
500 TABLET, FILM COATED ORAL EVERY 6 HOURS PRN
Qty: 60 TABLET | Refills: 1 | Status: SHIPPED | OUTPATIENT
Start: 2025-06-02

## 2025-06-02 NOTE — PROGRESS NOTES
CC:   Chief Complaint   Patient presents with    Foot Injury     Fell yesterday after tripping over dog leash        History:  Violet Watts is a 64 y.o. female who presents today for evaluation of the above problems.      HPI  Patient presents for right foot pain.  States yesterday she tripped over her dog leash and landed on her right foot.  States she stepped on her right foot.  Initially has severe pain, redness and some swelling to top of right foot.  States she could not walk on it and has been using crutches.  However, today she feels much improvement in swelling, redness and pain.  Has full range of motion.  Able to bear weight with some pain.    Allergies   Allergen Reactions    Losartan Mental Status Change    Adalat [Nifedipine] Swelling    Phenazopyridine Itching    Statins Myalgia    Praluent [Alirocumab] Rash    Repatha [Evolocumab] Rash    Sulfa Antibiotics Rash     Past Medical History:   Diagnosis Date    Arthritis     Atrial fibrillation     Glaucoma (increased eye pressure) Left eye     Hyperlipidemia     Hypertension     SVT (supraventricular tachycardia)      Past Surgical History:   Procedure Laterality Date    BELPHAROPTOSIS REPAIR Bilateral     CATARACT EXTRACTION Bilateral     CHOLECYSTECTOMY      COLONOSCOPY  11/17/2023    Dr Pham.  On 10 year plan    ENDOLYMPHATIC SHUNT DECOMPRESSION      HYSTERECTOMY      LAMINECTOMY       Family History   Problem Relation Age of Onset    Breast cancer Mother     Cancer Mother     Cancer Sister     Breast cancer Sister     Breast cancer Sister     Cancer Sister       reports that she has never smoked. She has never been exposed to tobacco smoke. She has never used smokeless tobacco. She reports that she does not drink alcohol and does not use drugs.      Current Outpatient Medications:     acetaminophen (TYLENOL) 650 MG 8 hr tablet, Take 1 tablet by mouth 2 (Two) Times a Day., Disp: , Rfl:     Cartia  MG 24 hr capsule, Take 1 capsule by mouth  "Daily., Disp: , Rfl:     Cetirizine-Pseudoephedrine (ZYRTEC-D ALLERGY & CONGESTION PO), Take  by mouth., Disp: , Rfl:     Cholecalciferol 1.25 MG (17697 UT) tablet, Take 1 tablet by mouth 1 (One) Time Per Week., Disp: 4 tablet, Rfl: 5    Eliquis 5 MG tablet tablet, Take 1 tablet by mouth 2 (Two) Times a Day., Disp: , Rfl:     flecainide (TAMBOCOR) 50 MG tablet, Take 1 tablet by mouth 2 (Two) Times a Day., Disp: , Rfl:     fluticasone (Flonase Allergy Relief) 50 MCG/ACT nasal spray, Administer 1 spray into the nostril(s) as directed by provider 2 (Two) Times a Day., Disp: , Rfl:     Inclisiran Sodium (Leqvio) 284 MG/1.5ML solution prefilled syringe, Every 6 (Six) Months., Disp: , Rfl:     indapamide (LOZOL) 2.5 MG tablet, Take 1 tablet by mouth Daily., Disp: , Rfl:     lisinopril (PRINIVIL,ZESTRIL) 40 MG tablet, Take 1 tablet by mouth Daily., Disp: , Rfl:     multivitamin with minerals (WOMENS MULTIVITAMIN PO), Take 1 tablet by mouth Daily., Disp: , Rfl:     Red Yeast Rice Extract 600 MG tablet, Take 1 tablet by mouth Daily., Disp: , Rfl:     timolol (TIMOPTIC) 0.5 % ophthalmic solution, Administer 1 drop to both eyes Every Morning., Disp: , Rfl:     vitamin B-12 (CYANOCOBALAMIN) 1000 MCG tablet, Take 1 tablet by mouth Daily., Disp: 30 tablet, Rfl: 3    Xalatan 0.005 % ophthalmic solution, Administer 1 drop to both eyes Every Night., Disp: , Rfl:     methocarbamol (ROBAXIN) 500 MG tablet, Take 1 tablet by mouth Every 6 (Six) Hours As Needed for Muscle Spasms., Disp: 60 tablet, Rfl: 1    OBJECTIVE:  /70 (BP Location: Left arm, Patient Position: Sitting, Cuff Size: Large Adult)   Pulse 80   Temp 98.1 °F (36.7 °C) (Temporal)   Resp 18   Ht 177.8 cm (70\")   Wt 102 kg (224 lb)   SpO2 96%   BMI 32.14 kg/m²    Estimated body mass index is 32.14 kg/m² as calculated from the following:    Height as of this encounter: 177.8 cm (70\").    Weight as of this encounter: 102 kg (224 lb).                Physical " Exam  Vitals reviewed.   Constitutional:       General: She is not in acute distress.     Appearance: Normal appearance.   HENT:      Head: Normocephalic and atraumatic.   Cardiovascular:      Rate and Rhythm: Normal rate and regular rhythm.      Heart sounds: Normal heart sounds.   Pulmonary:      Effort: No respiratory distress.      Breath sounds: Normal breath sounds. No wheezing.   Musculoskeletal:      Right foot: Normal range of motion and normal capillary refill. Tenderness (top of right foot, redness to top of foot) present. Normal pulse.   Skin:     General: Skin is warm and dry.   Neurological:      Mental Status: She is alert and oriented to person, place, and time.   Psychiatric:         Mood and Affect: Mood normal.         Behavior: Behavior normal.              Assessment/Plan    Diagnoses and all orders for this visit:    1. Right foot pain (Primary)  -     XR Foot 3+ View Right; Future    2. Fall, initial encounter  -     XR Foot 3+ View Right; Future    3. Muscle spasm  -     methocarbamol (ROBAXIN) 500 MG tablet; Take 1 tablet by mouth Every 6 (Six) Hours As Needed for Muscle Spasms.  Dispense: 60 tablet; Refill: 1    Believe mainly has contusion to area based on injury. Encouraged to apply ice and weight bear as tolerated. Xray order given to patient to get xrays if worsens or any concerns in the next few days.   Patient requested refill on muscle relaxer  Also needed work note today            An After Visit Summary was printed and given to the patient at discharge.  Return if symptoms worsen or fail to improve.

## 2025-06-02 NOTE — LETTER
June 2, 2025     Patient: Violet Watts   YOB: 1960   Date of Visit: 6/2/2025       To Whom It May Concern:    It is my medical opinion that Violet Watts may return on 6/6/2025. Please excuse from work 6/2/2025-6/5/2025.           Sincerely,        VESTA Hamilton    CC: No Recipients

## 2025-08-20 RX ORDER — ERGOCALCIFEROL 1.25 MG/1
50000 CAPSULE, LIQUID FILLED ORAL WEEKLY
Qty: 4 CAPSULE | Refills: 0 | Status: SHIPPED | OUTPATIENT
Start: 2025-08-20